# Patient Record
Sex: FEMALE | Race: WHITE | Employment: UNEMPLOYED | URBAN - METROPOLITAN AREA
[De-identification: names, ages, dates, MRNs, and addresses within clinical notes are randomized per-mention and may not be internally consistent; named-entity substitution may affect disease eponyms.]

---

## 2018-10-03 ENCOUNTER — OFFICE VISIT (OUTPATIENT)
Dept: FAMILY MEDICINE | Facility: CLINIC | Age: 58
End: 2018-10-03
Payer: COMMERCIAL

## 2018-10-03 VITALS
WEIGHT: 202.3 LBS | TEMPERATURE: 98.9 F | RESPIRATION RATE: 16 BRPM | HEIGHT: 65 IN | OXYGEN SATURATION: 97 % | DIASTOLIC BLOOD PRESSURE: 90 MMHG | BODY MASS INDEX: 33.7 KG/M2 | HEART RATE: 84 BPM | SYSTOLIC BLOOD PRESSURE: 138 MMHG

## 2018-10-03 DIAGNOSIS — Z12.11 SPECIAL SCREENING FOR MALIGNANT NEOPLASMS, COLON: ICD-10-CM

## 2018-10-03 DIAGNOSIS — T78.40XA ALLERGIC REACTION, INITIAL ENCOUNTER: Primary | ICD-10-CM

## 2018-10-03 DIAGNOSIS — J45.20 MILD INTERMITTENT ASTHMA WITHOUT COMPLICATION: ICD-10-CM

## 2018-10-03 DIAGNOSIS — B00.1 COLD SORE: ICD-10-CM

## 2018-10-03 PROCEDURE — 99204 OFFICE O/P NEW MOD 45 MIN: CPT | Performed by: NURSE PRACTITIONER

## 2018-10-03 RX ORDER — VALACYCLOVIR HYDROCHLORIDE 500 MG/1
500 TABLET, FILM COATED ORAL DAILY
Qty: 90 TABLET | Refills: 3 | Status: SHIPPED | OUTPATIENT
Start: 2018-10-03

## 2018-10-03 RX ORDER — FLUTICASONE PROPIONATE 50 MCG
1 SPRAY, SUSPENSION (ML) NASAL
COMMUNITY
Start: 2016-06-21

## 2018-10-03 RX ORDER — MONTELUKAST SODIUM 10 MG/1
10 TABLET ORAL AT BEDTIME
Qty: 90 TABLET | Refills: 3 | Status: SHIPPED | OUTPATIENT
Start: 2018-10-03

## 2018-10-03 RX ORDER — VALACYCLOVIR HCL 500 MG
TABLET ORAL
Refills: 2 | COMMUNITY
Start: 2018-02-03

## 2018-10-03 RX ORDER — VALACYCLOVIR HYDROCHLORIDE 1 G/1
2000 TABLET, FILM COATED ORAL 2 TIMES DAILY
Qty: 4 TABLET | Refills: 3 | Status: SHIPPED | OUTPATIENT
Start: 2018-10-03

## 2018-10-03 RX ORDER — EPINEPHRINE 0.3 MG/.3ML
0.3 INJECTION SUBCUTANEOUS PRN
Qty: 0.6 ML | Refills: 0 | Status: SHIPPED | OUTPATIENT
Start: 2018-10-03

## 2018-10-03 RX ORDER — VALACYCLOVIR HYDROCHLORIDE 500 MG/1
500 TABLET, FILM COATED ORAL DAILY
Qty: 90 TABLET | Refills: 3 | Status: SHIPPED | OUTPATIENT
Start: 2018-10-03 | End: 2018-10-03

## 2018-10-03 RX ORDER — ALBUTEROL SULFATE 90 UG/1
1-2 AEROSOL, METERED RESPIRATORY (INHALATION)
COMMUNITY
Start: 2017-05-04 | End: 2018-10-03

## 2018-10-03 RX ORDER — ALBUTEROL SULFATE 90 UG/1
1-2 AEROSOL, METERED RESPIRATORY (INHALATION) EVERY 6 HOURS PRN
Qty: 1 INHALER | Refills: 3 | Status: SHIPPED | OUTPATIENT
Start: 2018-10-03

## 2018-10-03 RX ORDER — MONTELUKAST SODIUM 10 MG/1
10 TABLET ORAL
COMMUNITY
Start: 2016-06-21 | End: 2018-10-03

## 2018-10-03 NOTE — MR AVS SNAPSHOT
After Visit Summary   10/3/2018    Mitra Mcginnis    MRN: [id number]           Patient Information     Date Of Birth          1960        Visit Information        Provider Department      10/3/2018 2:00 PM Nadege Sanchez Ra, APRN CNP [facility]        Today's Diagnoses     Special screening for malignant neoplasms, colon    -  1    Mild intermittent asthma without complication        Allergic reaction, initial encounter        Cold sore           Follow-ups after your visit        Additional Services     ALLERGY/ASTHMA ADULT REFERRAL       Your provider has referred you to: N: [facility] [phone]   [email or web address]    Please be aware that coverage of these services is subject to the terms and limitations of your health insurance plan.  Call member services at your health plan with any benefit or coverage questions.      Please bring the following with you to your appointment:    (1) Any X-Rays, CTs or MRIs which have been performed.  Contact the facility where they were done to arrange for  prior to your scheduled appointment.    (2) List of current medications  (3) This referral request   (4) Any documents/labs given to you for this referral                  Follow-up notes from your care team     Return in about 3 months (around 1/3/2019) for Physical Exam.      Future tests that were ordered for you today     Open Future Orders        Priority Expected Expires Ordered    Fecal colorectal cancer screen (FIT) Routine 10/24/2018 12/26/2018 10/3/2018            Who to contact     If you have questions or need follow up information about today's clinic visit or your schedule please contact [facility] directly at [phone].  Normal or non-critical lab and imaging results will be communicated to you by MyChart, letter or phone within 4 business days after the clinic has received the results. If you do not hear from us within  "7 days, please contact the clinic through Jamn or phone. If you have a critical or abnormal lab result, we will notify you by phone as soon as possible.  Submit refill requests through Jamn or call your pharmacy and they will forward the refill request to us. Please allow 3 business days for your refill to be completed.          Additional Information About Your Visit        Jamn Information     Jamn lets you send messages to your doctor, view your test results, renew your prescriptions, schedule appointments and more. To sign up, go to [email or web address] . Click on \"Log in\" on the left side of the screen, which will take you to the Welcome page. Then click on \"Sign up Now\" on the right side of the page.     You will be asked to enter the access code listed below, as well as some personal information. Please follow the directions to create your username and password.     Your access code is: [email or web address]  Expires: 2019  2:48 PM     Your access code will  in 90 days. If you need help or a new code, please call your [facility] clinic or [phone].        Care EveryWhere ID     This is your Care EveryWhere ID. This could be used by other organizations to access your [facility] medical records  [id number]        Your Vitals Were     Pulse Temperature Respirations Height Pulse Oximetry BMI (Body Mass Index)    84 98.9  F (37.2  C) (Tympanic) 16 5' 4.5\" (1.638 m) 97% 34.19 kg/m2       Blood Pressure from Last 3 Encounters:   10/03/18 138/90    Weight from Last 3 Encounters:   10/03/18 202 lb 4.8 oz (91.8 kg)              We Performed the Following     ALLERGY/ASTHMA ADULT REFERRAL          Today's Medication Changes          These changes are accurate as of 10/3/18  2:48 PM.  If you have any questions, ask your nurse or doctor.               Start taking these medicines.        Dose/Directions    EPINEPHrine 0.3 MG/0.3ML injection 2-pack   Commonly known as:  EPIPEN/ADRENACLICK/or ANY BX " GENERIC EQUIV   Used for:  Allergic reaction, initial encounter   Started by:  Nadege Sanchez Ra, APRN CNP        Dose:  0.3 mg   Inject 0.3 mLs (0.3 mg) into the muscle as needed for anaphylaxis   Quantity:  0.6 mL   Refills:  0         These medicines have changed or have updated prescriptions.        Dose/Directions    albuterol 108 (90 Base) MCG/ACT inhaler   Commonly known as:  PROAIR HFA/PROVENTIL HFA/VENTOLIN HFA   This may have changed:    - when to take this  - reasons to take this   Used for:  Mild intermittent asthma without complication   Changed by:  Nadege Sanchez Ra, APRN CNP        Dose:  1-2 puff   Inhale 1-2 puffs into the lungs every 6 hours as needed for shortness of breath / dyspnea or wheezing   Quantity:  1 Inhaler   Refills:  3       montelukast 10 MG tablet   Commonly known as:  SINGULAIR   This may have changed:  when to take this   Used for:  Mild intermittent asthma without complication   Changed by:  Nadege Sanchez Ra, APRN CNP        Dose:  10 mg   Take 1 tablet (10 mg) by mouth At Bedtime   Quantity:  90 tablet   Refills:  3       * VALTREX 500 MG tablet   This may have changed:  Another medication with the same name was added. Make sure you understand how and when to take each.   Generic drug:  valACYclovir   Changed by:  Nadege Sanchez Ra, APRN CNP        TK 1 T PO BID   Refills:  2       * valACYclovir 1000 mg tablet   Commonly known as:  VALTREX   This may have changed:  You were already taking a medication with the same name, and this prescription was added. Make sure you understand how and when to take each.   Used for:  Cold sore   Changed by:  Nadege Sanchez Ra, APRN CNP        Dose:  2000 mg   Take 2 tablets (2,000 mg) by mouth 2 times daily   Quantity:  4 tablet   Refills:  3       * valACYclovir 500 MG tablet   Commonly known as:  VALTREX   This may have changed:  You were already taking a medication with the same name, and this prescription was added. Make sure  you understand how and when to take each.   Used for:  Cold sore   Changed by:  Nadege Sanchez Ra, APRN CNP        Dose:  500 mg   Take 1 tablet (500 mg) by mouth daily   Quantity:  90 tablet   Refills:  3       * Notice:  This list has 3 medication(s) that are the same as other medications prescribed for you. Read the directions carefully, and ask your doctor or other care provider to review them with you.         Where to get your medicines      These medications were sent to [facility] [id number] [place] [address] [facility] AT [place] [address] & [facility]  [address] [facility], [facility] [address]     Phone:  [phone]     albuterol 108 (90 Base) MCG/ACT inhaler    EPINEPHrine 0.3 MG/0.3ML injection 2-pack    montelukast 10 MG tablet    valACYclovir 1000 mg tablet    valACYclovir 500 MG tablet                Primary Care Provider Office Phone # Fax #    MANDEEP Mcdowell Ra, [phone] [address]  [facility] [address]        Equal Access to Services     [facility]: Hadii aad ku hadasho Soomaali, waaxda luqadaha, qaybta kaalmada adeegyada, waxay hollie bartholomew . So [facility] [phone].    ATENCIÓN: Si habla español, tiene a vegas disposición servicios gratuitos de asistencia lingüística. Llame al [phone].    We comply with applicable federal civil rights laws and [place] laws. We do not discriminate on the basis of race, color, national origin, age, disability, sex, sexual orientation, or gender identity.            Thank you!     Thank you for choosing [facility]  for your care. Our goal is always to provide you with excellent care. Hearing back from our patients is one way we can continue to improve our services. Please take a few minutes to complete the written survey that you may receive in the mail after your visit with us. Thank you!             Your Updated Medication List - Protect others around you: Learn  how to safely use, store and throw away your medicines at www.disposemymeds.org.          This list is accurate as of 10/3/18  2:48 PM.  Always use your most recent med list.                   Brand Name Dispense Instructions for use Diagnosis    albuterol 108 (90 Base) MCG/ACT inhaler    PROAIR HFA/PROVENTIL HFA/VENTOLIN HFA    1 Inhaler    Inhale 1-2 puffs into the lungs every 6 hours as needed for shortness of breath / dyspnea or wheezing    Mild intermittent asthma without complication       EPINEPHrine 0.3 MG/0.3ML injection 2-pack    EPIPEN/ADRENACLICK/or ANY BX GENERIC EQUIV    0.6 mL    Inject 0.3 mLs (0.3 mg) into the muscle as needed for anaphylaxis    Allergic reaction, initial encounter       fluticasone 50 MCG/ACT spray    FLONASE     Spray 1 spray in nostril        montelukast 10 MG tablet    SINGULAIR    90 tablet    Take 1 tablet (10 mg) by mouth At Bedtime    Mild intermittent asthma without complication       * VALTREX 500 MG tablet   Generic drug:  valACYclovir      TK 1 T PO BID        * valACYclovir 1000 mg tablet    VALTREX    4 tablet    Take 2 tablets (2,000 mg) by mouth 2 times daily    Cold sore       * valACYclovir 500 MG tablet    VALTREX    90 tablet    Take 1 tablet (500 mg) by mouth daily    Cold sore       * Notice:  This list has 3 medication(s) that are the same as other medications prescribed for you. Read the directions carefully, and ask your doctor or other care provider to review them with you.

## 2018-10-03 NOTE — PROGRESS NOTES
SUBJECTIVE:   Mitra Mcginnis is a 58 year old female who presents to clinic today for the following health issues:      ALLERGIES      Duration: 3 weeks    Description:   Nasal congestion: no  Sneezing: no  Red, itchy eyes: no    Accompanying signs and symptoms: Swollen lips, trouble breathing, tongue and eye lid swelling     History (similar episodes/allergy testing): Patient has allergy to garlic    Precipitating or alleviating factors: None    Therapies tried and outcome: benadryl- somewhat effective    Pt has a known garlic allergy.  Accidentally ingested some garlic from a restaurant.  She took benadryl immediately.  Took about 1 week for symptoms to resolve.  Earlier in the week her  had some garlic sausage in the refrigerator.  She ate some cheese that was next to this and started to have a reaction again and needed benadryl for the following few days.  Last night she was at her grandchild's swim meet and someone was eating something with garlic and she immediately started to have some respiratory symptoms.  Coughing.    With ingestion she has swelling of the lips, coughing, chest tightness.    She has a hx of asthma.  At one time was on advair daily but dc'd this and felt okay.  She has not used this over the past year.    Hx of cold sores.  Has required daily med to help due to frequency.    Problem list and histories reviewed & adjusted, as indicated.  Additional history: as documented    Patient Active Problem List   Diagnosis     Mild intermittent asthma without complication     History reviewed. No pertinent surgical history.    Social History   Substance Use Topics     Smoking status: Never Smoker     Smokeless tobacco: Never Used     Alcohol use Yes     History reviewed. No pertinent family history.        Reviewed and updated as needed this visit by clinical staff       Reviewed and updated as needed this visit by Provider         ROS:  SEE HPI.    OBJECTIVE:     /90 (BP Location: Right  "arm, Patient Position: Chair, Cuff Size: Adult Large)  Pulse 84  Temp 98.9  F (37.2  C) (Tympanic)  Resp 16  Ht 5' 4.5\" (1.638 m)  Wt 202 lb 4.8 oz (91.8 kg)  SpO2 97%  BMI 34.19 kg/m2  Body mass index is 34.19 kg/(m^2).  GENERAL: healthy, alert and no distress  RESP: lungs clear to auscultation - no rales, rhonchi or wheezes  CV: regular rate and rhythm, normal S1 S2, no S3 or S4, no murmur, click or rub, no peripheral edema and peripheral pulses strong  PSYCH: mentation appears normal, affect normal/bright    Diagnostic Test Results:  none     ASSESSMENT/PLAN:   1. Allergic reaction, initial encounter  Stable now.  Interested in seeing allergist due to several allergies and worsening reactions.  - EPINEPHrine (EPIPEN/ADRENACLICK/OR ANY BX GENERIC EQUIV) 0.3 MG/0.3ML injection 2-pack; Inject 0.3 mLs (0.3 mg) into the muscle as needed for anaphylaxis  Dispense: 0.6 mL; Refill: 0  - ALLERGY/ASTHMA ADULT REFERRAL    2. Mild intermittent asthma without complication  She would likely benefit from an inhaled ICS.  She will wait and discuss with allergy and asthma.  Refilled current meds.  - albuterol (PROAIR HFA/PROVENTIL HFA/VENTOLIN HFA) 108 (90 Base) MCG/ACT inhaler; Inhale 1-2 puffs into the lungs every 6 hours as needed for shortness of breath / dyspnea or wheezing  Dispense: 1 Inhaler; Refill: 3  - montelukast (SINGULAIR) 10 MG tablet; Take 1 tablet (10 mg) by mouth At Bedtime  Dispense: 90 tablet; Refill: 3  - ALLERGY/ASTHMA ADULT REFERRAL    3. Cold sore  Refilled.  Will check labs when she is back for her physical.  - valACYclovir (VALTREX) 1000 mg tablet; Take 2 tablets (2,000 mg) by mouth 2 times daily  Dispense: 4 tablet; Refill: 3  - valACYclovir (VALTREX) 500 MG tablet; Take 1 tablet (500 mg) by mouth daily  Dispense: 90 tablet; Refill: 3    4. Special screening for malignant neoplasms, colon  - Fecal colorectal cancer screen (FIT); Future    MANDEEP Mcdowell Ra [facility] [place]  "

## 2018-10-03 NOTE — PATIENT INSTRUCTIONS
Schedule with the allergist as we discussed.    You would likely benefit from a daily inhaler.    See me for a physical in the next few months.

## 2018-10-05 ENCOUNTER — HEALTH MAINTENANCE LETTER (OUTPATIENT)
Age: 58
End: 2018-10-05

## 2018-10-05 DIAGNOSIS — J45.20 MILD INTERMITTENT ASTHMA WITHOUT COMPLICATION: ICD-10-CM

## 2018-10-05 DIAGNOSIS — B00.1 COLD SORE: ICD-10-CM

## 2018-10-05 RX ORDER — MONTELUKAST SODIUM 10 MG/1
10 TABLET ORAL AT BEDTIME
Qty: 90 TABLET | Refills: 3 | Status: CANCELLED | OUTPATIENT
Start: 2018-10-05

## 2018-10-05 RX ORDER — VALACYCLOVIR HYDROCHLORIDE 500 MG/1
500 TABLET, FILM COATED ORAL DAILY
Qty: 90 TABLET | Refills: 3 | Status: CANCELLED | OUTPATIENT
Start: 2018-10-05

## 2018-10-05 NOTE — TELEPHONE ENCOUNTER
Patient would like script sent as MARLYN for brand please.    Disp Refills Start End MARLYN    montelukast (SINGULAIR) 10 MG tablet 90 tablet 3 10/3/2018  No   Sig - Route: Take 1 tablet (10 mg) by mouth At Bedtime - Oral        Disp Refills Start End MARLYN   valACYclovir (VALTREX) 500 MG tablet 90 tablet 3 10/3/2018  Yes   Sig - Route: Take 1 tablet (500 mg) by mouth daily - Oral

## 2018-10-18 DIAGNOSIS — J45.20 MILD INTERMITTENT ASTHMA WITHOUT COMPLICATION: ICD-10-CM

## 2018-10-18 RX ORDER — ALBUTEROL SULFATE 90 UG/1
2 AEROSOL, METERED RESPIRATORY (INHALATION) EVERY 6 HOURS PRN
Qty: 1 INHALER | Refills: 3 | Status: SHIPPED | OUTPATIENT
Start: 2018-10-18

## 2018-10-18 NOTE — TELEPHONE ENCOUNTER
Patient calling because she can only take name brand of Valtrex, Proair and Singulair. Nadege was going to put as MARLYN for all prescriptions, but pharmacy is telling her it was only written on the 1000 mg of Valtrex.     Notified her it is MARLYN on both 1000 mg and 500 mg of Valtrex, but not MARLYN on the others. Will make corrections as stated and route to PCP for approval.    Miguelina ROSALES, Triage RN